# Patient Record
Sex: FEMALE | Race: WHITE | NOT HISPANIC OR LATINO | Employment: STUDENT | URBAN - METROPOLITAN AREA
[De-identification: names, ages, dates, MRNs, and addresses within clinical notes are randomized per-mention and may not be internally consistent; named-entity substitution may affect disease eponyms.]

---
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
## 2022-07-18 ENCOUNTER — APPOINTMENT (OUTPATIENT)
Dept: LAB | Facility: AMBULARY SURGERY CENTER | Age: 22
End: 2022-07-18
Attending: INTERNAL MEDICINE

## 2022-07-18 ENCOUNTER — OFFICE VISIT (OUTPATIENT)
Dept: GASTROENTEROLOGY | Facility: AMBULARY SURGERY CENTER | Age: 22
End: 2022-07-18

## 2022-07-18 VITALS
BODY MASS INDEX: 31.54 KG/M2 | HEIGHT: 62 IN | OXYGEN SATURATION: 100 % | SYSTOLIC BLOOD PRESSURE: 108 MMHG | RESPIRATION RATE: 18 BRPM | WEIGHT: 171.4 LBS | DIASTOLIC BLOOD PRESSURE: 62 MMHG | HEART RATE: 74 BPM

## 2022-07-18 DIAGNOSIS — K21.9 GASTROESOPHAGEAL REFLUX DISEASE WITHOUT ESOPHAGITIS: ICD-10-CM

## 2022-07-18 DIAGNOSIS — R10.13 EPIGASTRIC PAIN: ICD-10-CM

## 2022-07-18 DIAGNOSIS — R19.5 LOOSE STOOLS: ICD-10-CM

## 2022-07-18 DIAGNOSIS — R19.5 LOOSE STOOLS: Primary | ICD-10-CM

## 2022-07-18 PROBLEM — R10.9 ABDOMINAL PAIN: Status: ACTIVE | Noted: 2022-07-18

## 2022-07-18 LAB
ALBUMIN SERPL BCP-MCNC: 4 G/DL (ref 3.5–5)
ALP SERPL-CCNC: 82 U/L (ref 46–384)
ALT SERPL W P-5'-P-CCNC: 85 U/L (ref 12–78)
ANION GAP SERPL CALCULATED.3IONS-SCNC: 3 MMOL/L (ref 4–13)
AST SERPL W P-5'-P-CCNC: 74 U/L (ref 5–45)
BASOPHILS # BLD AUTO: 0.04 THOUSANDS/ΜL (ref 0–0.1)
BASOPHILS NFR BLD AUTO: 1 % (ref 0–1)
BILIRUB DIRECT SERPL-MCNC: 0.19 MG/DL (ref 0–0.2)
BILIRUB SERPL-MCNC: 0.89 MG/DL (ref 0.2–1)
BUN SERPL-MCNC: 12 MG/DL (ref 5–25)
CALCIUM SERPL-MCNC: 9.2 MG/DL (ref 8.3–10.1)
CHLORIDE SERPL-SCNC: 109 MMOL/L (ref 96–108)
CO2 SERPL-SCNC: 25 MMOL/L (ref 21–32)
CREAT SERPL-MCNC: 0.88 MG/DL (ref 0.6–1.3)
CRP SERPL QL: 7.4 MG/L
EOSINOPHIL # BLD AUTO: 0.23 THOUSAND/ΜL (ref 0–0.61)
EOSINOPHIL NFR BLD AUTO: 3 % (ref 0–6)
ERYTHROCYTE [DISTWIDTH] IN BLOOD BY AUTOMATED COUNT: 12.2 % (ref 11.6–15.1)
GFR SERPL CREATININE-BSD FRML MDRD: 95 ML/MIN/1.73SQ M
GLUCOSE SERPL-MCNC: 93 MG/DL (ref 65–140)
HCT VFR BLD AUTO: 43.9 % (ref 34.8–46.1)
HGB BLD-MCNC: 14.2 G/DL (ref 11.5–15.4)
IMM GRANULOCYTES # BLD AUTO: 0.01 THOUSAND/UL (ref 0–0.2)
IMM GRANULOCYTES NFR BLD AUTO: 0 % (ref 0–2)
LYMPHOCYTES # BLD AUTO: 2.03 THOUSANDS/ΜL (ref 0.6–4.47)
LYMPHOCYTES NFR BLD AUTO: 30 % (ref 14–44)
MCH RBC QN AUTO: 29 PG (ref 26.8–34.3)
MCHC RBC AUTO-ENTMCNC: 32.3 G/DL (ref 31.4–37.4)
MCV RBC AUTO: 90 FL (ref 82–98)
MONOCYTES # BLD AUTO: 0.42 THOUSAND/ΜL (ref 0.17–1.22)
MONOCYTES NFR BLD AUTO: 6 % (ref 4–12)
NEUTROPHILS # BLD AUTO: 3.94 THOUSANDS/ΜL (ref 1.85–7.62)
NEUTS SEG NFR BLD AUTO: 60 % (ref 43–75)
NRBC BLD AUTO-RTO: 0 /100 WBCS
PLATELET # BLD AUTO: 339 THOUSANDS/UL (ref 149–390)
PMV BLD AUTO: 10.8 FL (ref 8.9–12.7)
POTASSIUM SERPL-SCNC: 4.4 MMOL/L (ref 3.5–5.3)
PROT SERPL-MCNC: 7.7 G/DL (ref 6.4–8.4)
RBC # BLD AUTO: 4.9 MILLION/UL (ref 3.81–5.12)
SODIUM SERPL-SCNC: 137 MMOL/L (ref 135–147)
WBC # BLD AUTO: 6.67 THOUSAND/UL (ref 4.31–10.16)

## 2022-07-18 PROCEDURE — 86364 TISS TRNSGLTMNASE EA IG CLAS: CPT

## 2022-07-18 PROCEDURE — 80076 HEPATIC FUNCTION PANEL: CPT

## 2022-07-18 PROCEDURE — 99244 OFF/OP CNSLTJ NEW/EST MOD 40: CPT | Performed by: INTERNAL MEDICINE

## 2022-07-18 PROCEDURE — 82784 ASSAY IGA/IGD/IGG/IGM EACH: CPT

## 2022-07-18 PROCEDURE — 86258 DGP ANTIBODY EACH IG CLASS: CPT

## 2022-07-18 PROCEDURE — 86231 EMA EACH IG CLASS: CPT

## 2022-07-18 PROCEDURE — 36415 COLL VENOUS BLD VENIPUNCTURE: CPT

## 2022-07-18 PROCEDURE — 86140 C-REACTIVE PROTEIN: CPT

## 2022-07-18 PROCEDURE — 80048 BASIC METABOLIC PNL TOTAL CA: CPT

## 2022-07-18 PROCEDURE — 85025 COMPLETE CBC W/AUTO DIFF WBC: CPT

## 2022-07-18 RX ORDER — DICYCLOMINE HYDROCHLORIDE 10 MG/1
10 CAPSULE ORAL
Qty: 90 CAPSULE | Refills: 3 | Status: SHIPPED | OUTPATIENT
Start: 2022-07-18

## 2022-07-18 RX ORDER — NORETHINDRONE ACETATE AND ETHINYL ESTRADIOL 1MG-20(21)
KIT ORAL
COMMUNITY
Start: 2022-05-26

## 2022-07-18 NOTE — PATIENT INSTRUCTIONS
1 tablespoon of fiber supplement daily (benefiber, metamucil, citrucel)  Lactose free diet  Wait 3 hours after eating before lying down  Take famotidine 20mg (pepcid) as needed for heartburn

## 2022-07-18 NOTE — LETTER
July 18, 2022     Georges Reeder MD    Patient: Al Das   YOB: 2000   Date of Visit: 7/18/2022       Dear Dr Adilia Wilson:    Thank you for referring Al Das to me for evaluation  Below are my notes for this consultation  If you have questions, please do not hesitate to call me  I look forward to following your patient along with you  Sincerely,        Edie Granados MD        CC: No Recipients  Edie Granados MD  7/18/2022 11:52 AM  Sign when Signing Visit  Consultation - [facility]  Al Das 25 y o  female MRN: [id number]          Assessment & Plan:    Pleasant 57-year-old female with longstanding history of loose stools with postprandial urgency, abdominal pain which she describes as gas pain  1  Abdominal pain/loose stools:  Symptoms most consistent with irritable bowel syndrome with diarrhea predominant symptom, may have worsened recently in the setting increasing anxiety  Will evaluate for other pathology   -recommended stressors modification techniques, consideration to follow-up with a therapist for further evaluation for stressors  -discussed with her that her symptoms may be secondary to IBS versus underlying organic pathology  -we will check laboratory studies including celiac panel, CRP, stool studies to evaluate for other causes of diarrhea and abdominal pain   -recommended daily fiber supplementation   -recommended strict lactose-free diet  -trial of dicyclomine  -will re-evaluate in 2 to 3 months, if symptoms persist or change proceed with endoscopic evaluation    2  GERD:  Intermittent symptoms about 3 days per week   -discussed with her that she needs to avoid eating late at night, which she frequently eats dinner after she returns from a with late night shift  -recommended taking famotidine when she does eat dinner late at night    Tamara was seen today for abdominal pain, constipation and diarrhea      Diagnoses and all orders for this visit:    Loose stools  -     Basic metabolic panel; Future  -     CBC and differential; Future  -     Celiac Disease Antibody Profile; Future  -     C-reactive protein; Future  -     Hepatic function panel; Future  -     Calprotectin,Fecal; Future  -     Ova and parasite examination; Future  -     Fecal leukocytes; Future  -     Pancreatic elastase, fecal; Future  -     dicyclomine (BENTYL) 10 mg capsule; Take 1 capsule (10 mg total) by mouth 3 (three) times a day before meals    Epigastric pain  -     dicyclomine (BENTYL) 10 mg capsule; Take 1 capsule (10 mg total) by mouth 3 (three) times a day before meals    Gastroesophageal reflux disease without esophagitis            _____________________________________________________________        CC:  Abdominal pain and loose stools    HPI:  Casimiro Dong is a 25 y  o female who was referred for evaluation of abdominal pain and loose stools  This is a pleasant 30-year-old female, otherwise healthy has had a longstanding history of abdominal pain loose stools for approximately the past 5 or 6 years  She reports having frequent bowel movements about 4 times per day with loose watery stools, occasional having urgency  She reports which she describes as gas pains throughout the course of the day  Rarely has constipation  Denies any melena or rectal bleeding  Patient has tried dietary modifications including gluten free diet, has tried to avoid dairy products without any significant improvement in her symptoms  She notes that over the last 1 or 2 years her symptoms have worsened  She does report having increased anxiety over the last 1 year which seem to precipitate some of the symptoms as well  Much of the pain occurs postprandially  She has not been able to identify any specific foods that precipitate her symptoms  She has gained about 30 lb in last 1 year        She has heartburn symptoms about 3 times per week for which she takes over-the-counter antacids including Gas-X and Tums with improvement in symptoms  He takes NSAIDs notably ibuprofen about 3 days during her  She has never had evaluation for the above symptoms  Patient is otherwise healthy    Surgical history is notable for tonsillectomy     Denies any tobacco, rarely drinks  She is currently of rising sophomore at [employer] also works at a [employer] or [employer]  Family history is negative for GI or associated malignancies though it is unknown as her mother is adopted  ROS:  The remainder of the ROS was negative except for the pertinent positives mentioned in HPI  Allergies: Patient has no known allergies  Medications:   Current Outpatient Medications:     dicyclomine (BENTYL) 10 mg capsule, Take 1 capsule (10 mg total) by mouth 3 (three) times a day before meals, Disp: 90 capsule, Rfl: 3    Blisovi FE 1/20 1-20 MG-MCG per tablet, , Disp: , Rfl:     Lo Loestrin Fe 1 MG-10 MCG / 10 MCG TABS, TAKE 1 TABLET DAILY (Patient not taking: Reported on 7/18/2022), Disp: 84 tablet, Rfl: 0'    Past Medical History:   Diagnosis Date    Acute pyelonephritis 7/3/2019    Head injury     Tendonitis     R ankle    Tonsillitis     Urinary tract infection        Past Surgical History:   Procedure Laterality Date    TONSILLECTOMY      TONSILLECTOMY      WISDOM TOOTH EXTRACTION         Family History   Problem Relation Age of Onset    No Known Problems Mother         adopted, frequent UTI's as a teen   No Known Problems Father     Cancer Paternal Grandmother     Cancer Paternal Grandfather         reports that she has never smoked  She has never used smokeless tobacco  She reports that she does not drink alcohol and does not use drugs            Physical Exam:     /62 (BP Location: Right arm, Patient Position: Sitting, Cuff Size: Standard)   Pulse 74   Resp 18   Ht 5' 2" (1 575 m)   Wt 77 7 kg (171 lb 6 4 oz)   SpO2 100%   BMI 31 35 kg/m²     Gen: wn/wd, NAD, healthy-appearing female  HEENT: anicteric, MMM, no cervical LAD  CVS: RRR, no m/r/g  CHEST: CTA b/l  ABD: +BS, soft, NT,ND, no hepatosplenomegaly  EXT: no c/c/e  NEURO: aaox3  SKIN: NO rashes

## 2022-07-18 NOTE — PROGRESS NOTES
Consultation - [facility]  Yady Tucker 25 y o  female MRN: [id number]          Assessment & Plan:    Pleasant 53-year-old female with longstanding history of loose stools with postprandial urgency, abdominal pain which she describes as gas pain  1  Abdominal pain/loose stools:  Symptoms most consistent with irritable bowel syndrome with diarrhea predominant symptom, may have worsened recently in the setting increasing anxiety  Will evaluate for other pathology   -recommended stressors modification techniques, consideration to follow-up with a therapist for further evaluation for stressors  -discussed with her that her symptoms may be secondary to IBS versus underlying organic pathology  -we will check laboratory studies including celiac panel, CRP, stool studies to evaluate for other causes of diarrhea and abdominal pain   -recommended daily fiber supplementation   -recommended strict lactose-free diet  -trial of dicyclomine  -will re-evaluate in 2 to 3 months, if symptoms persist or change proceed with endoscopic evaluation    2  GERD:  Intermittent symptoms about 3 days per week   -discussed with her that she needs to avoid eating late at night, which she frequently eats dinner after she returns from a with late night shift  -recommended taking famotidine when she does eat dinner late at night    Tamara was seen today for abdominal pain, constipation and diarrhea  Diagnoses and all orders for this visit:    Loose stools  -     Basic metabolic panel; Future  -     CBC and differential; Future  -     Celiac Disease Antibody Profile; Future  -     C-reactive protein; Future  -     Hepatic function panel; Future  -     Calprotectin,Fecal; Future  -     Ova and parasite examination; Future  -     Fecal leukocytes; Future  -     Pancreatic elastase, fecal; Future  -     dicyclomine (BENTYL) 10 mg capsule;  Take 1 capsule (10 mg total) by mouth 3 (three) times a day before meals    Epigastric pain  -     dicyclomine (BENTYL) 10 mg capsule; Take 1 capsule (10 mg total) by mouth 3 (three) times a day before meals    Gastroesophageal reflux disease without esophagitis            _____________________________________________________________        CC:  Abdominal pain and loose stools    HPI:  Lobo Quiroz is a 25 y  o female who was referred for evaluation of abdominal pain and loose stools  This is a pleasant 79-year-old female, otherwise healthy has had a longstanding history of abdominal pain loose stools for approximately the past 5 or 6 years  She reports having frequent bowel movements about 4 times per day with loose watery stools, occasional having urgency  She reports which she describes as gas pains throughout the course of the day  Rarely has constipation  Denies any melena or rectal bleeding  Patient has tried dietary modifications including gluten free diet, has tried to avoid dairy products without any significant improvement in her symptoms  She notes that over the last 1 or 2 years her symptoms have worsened  She does report having increased anxiety over the last 1 year which seem to precipitate some of the symptoms as well  Much of the pain occurs postprandially  She has not been able to identify any specific foods that precipitate her symptoms  She has gained about 30 lb in last 1 year  She has heartburn symptoms about 3 times per week for which she takes over-the-counter antacids including Gas-X and Tums with improvement in symptoms  He takes NSAIDs notably ibuprofen about 3 days during her  She has never had evaluation for the above symptoms  Patient is otherwise healthy    Surgical history is notable for tonsillectomy     Denies any tobacco, rarely drinks  She is currently of rising sophomore at [employer] also works at a [employer] or [employer]        Family history is negative for GI or associated malignancies though it is unknown as her mother is adopted  ROS:  The remainder of the ROS was negative except for the pertinent positives mentioned in HPI  Allergies: Patient has no known allergies  Medications:   Current Outpatient Medications:     dicyclomine (BENTYL) 10 mg capsule, Take 1 capsule (10 mg total) by mouth 3 (three) times a day before meals, Disp: 90 capsule, Rfl: 3    Blisovi FE 1/20 1-20 MG-MCG per tablet, , Disp: , Rfl:     Lo Loestrin Fe 1 MG-10 MCG / 10 MCG TABS, TAKE 1 TABLET DAILY (Patient not taking: Reported on 7/18/2022), Disp: 84 tablet, Rfl: 0'    Past Medical History:   Diagnosis Date    Acute pyelonephritis 7/3/2019    Head injury     Tendonitis     R ankle    Tonsillitis     Urinary tract infection        Past Surgical History:   Procedure Laterality Date    TONSILLECTOMY      TONSILLECTOMY      WISDOM TOOTH EXTRACTION         Family History   Problem Relation Age of Onset    No Known Problems Mother         adopted, frequent UTI's as a teen   No Known Problems Father     Cancer Paternal Grandmother     Cancer Paternal Grandfather         reports that she has never smoked  She has never used smokeless tobacco  She reports that she does not drink alcohol and does not use drugs            Physical Exam:     /62 (BP Location: Right arm, Patient Position: Sitting, Cuff Size: Standard)   Pulse 74   Resp 18   Ht 5' 2" (1 575 m)   Wt 77 7 kg (171 lb 6 4 oz)   SpO2 100%   BMI 31 35 kg/m²     Gen: wn/wd, NAD, healthy-appearing female  HEENT: anicteric, MMM, no cervical LAD  CVS: RRR, no m/r/g  CHEST: CTA b/l  ABD: +BS, soft, NT,ND, no hepatosplenomegaly  EXT: no c/c/e  NEURO: aaox3  SKIN: NO rashes

## 2022-07-19 ENCOUNTER — APPOINTMENT (OUTPATIENT)
Dept: LAB | Facility: AMBULARY SURGERY CENTER | Age: 22
End: 2022-07-19
Attending: INTERNAL MEDICINE

## 2022-07-19 DIAGNOSIS — R19.5 LOOSE STOOLS: ICD-10-CM

## 2022-07-19 LAB
ENDOMYSIUM IGA SER QL: NEGATIVE
GLIADIN PEPTIDE IGA SER-ACNC: 3 UNITS (ref 0–19)
GLIADIN PEPTIDE IGG SER-ACNC: 1 UNITS (ref 0–19)
IGA SERPL-MCNC: 77 MG/DL (ref 87–352)
TTG IGA SER-ACNC: <2 U/ML (ref 0–3)
TTG IGG SER-ACNC: <2 U/ML (ref 0–5)

## 2022-07-19 PROCEDURE — 87209 SMEAR COMPLEX STAIN: CPT

## 2022-07-19 PROCEDURE — 83993 ASSAY FOR CALPROTECTIN FECAL: CPT

## 2022-07-19 PROCEDURE — 89055 LEUKOCYTE ASSESSMENT FECAL: CPT

## 2022-07-19 PROCEDURE — 82653 EL-1 FECAL QUANTITATIVE: CPT

## 2022-07-19 PROCEDURE — 87177 OVA AND PARASITES SMEARS: CPT

## 2022-07-20 LAB — WBC SPEC QL GRAM STN: NORMAL

## 2022-07-22 LAB — CALPROTECTIN STL-MCNT: <16 UG/G (ref 0–120)

## 2022-07-24 LAB — ELASTASE PANC STL-MCNT: 243 UG ELAST./G

## 2022-08-02 ENCOUNTER — PATIENT MESSAGE (OUTPATIENT)
Dept: GASTROENTEROLOGY | Facility: AMBULARY SURGERY CENTER | Age: 22
End: 2022-08-02

## 2022-09-27 ENCOUNTER — TELEPHONE (OUTPATIENT)
Dept: OBGYN CLINIC | Facility: CLINIC | Age: 22
End: 2022-09-27

## 2022-09-27 NOTE — TELEPHONE ENCOUNTER
Pt called to make her yearly appt  She also mentioned she was having a yeast infection   I instructed her to try monistat 3 or 7 day and if that doesn't work to give us a call back

## 2022-10-03 ENCOUNTER — TELEPHONE (OUTPATIENT)
Dept: OTHER | Facility: OTHER | Age: 22
End: 2022-10-03

## 2022-10-03 NOTE — TELEPHONE ENCOUNTER
Patient called back  Confirmed appt on 10/11/2022 at 11:00 am with Landon Beatty PA-C  Patient verified medical insurance, [facility]

## 2023-01-09 ENCOUNTER — ANNUAL EXAM (OUTPATIENT)
Dept: OBGYN CLINIC | Facility: CLINIC | Age: 23
End: 2023-01-09

## 2023-01-09 VITALS
SYSTOLIC BLOOD PRESSURE: 122 MMHG | WEIGHT: 180.4 LBS | DIASTOLIC BLOOD PRESSURE: 78 MMHG | BODY MASS INDEX: 33.2 KG/M2 | HEIGHT: 62 IN

## 2023-01-09 DIAGNOSIS — N94.6 DYSMENORRHEA: ICD-10-CM

## 2023-01-09 DIAGNOSIS — Z01.419 ENCOUNTER FOR GYNECOLOGICAL EXAMINATION (GENERAL) (ROUTINE) WITHOUT ABNORMAL FINDINGS: Primary | ICD-10-CM

## 2023-01-09 DIAGNOSIS — N76.0 VULVOVAGINITIS: ICD-10-CM

## 2023-01-09 DIAGNOSIS — Z30.09 BIRTH CONTROL COUNSELING: ICD-10-CM

## 2023-01-09 DIAGNOSIS — Z11.3 SCREENING FOR STD (SEXUALLY TRANSMITTED DISEASE): ICD-10-CM

## 2023-01-09 NOTE — PROGRESS NOTES
Assessment/Plan   Diagnoses and all orders for this visit:    Encounter for gynecological examination (general) (routine) without abnormal findings  Pap smears will start at age 24 per the ASCCP guidelines  Screening for STD (sexually transmitted disease)  -     Chlamydia/GC amplified DNA by PCR; Future  -     Hepatitis B surface antigen; Future  -     Hepatitis C antibody; Future  -     HIV 1/2 AG/AB w Reflex [facility] for 2 yr old and above; Future  -     RPR; Future  Vulvovaginitis  Encourage safe sexual practices; STI testing - C/G ordered to have done off first urine of day at lab; STI labs ordered as well; Reviewed since declines pelvic exam today, cannot assess for infection; if symptoms persist/worsen after period can trial longer course of boric acid or OTC Monistat 3 or 7  She can also RTO if symptoms persist, worsen, fail OTC therapies    Dysmenorrhea  Encourage patient to trial ibuprofen 600-800 mg taken with food q 6-8 hours as needed for menstrual cramps - advise to start prior to onset of cramps to get better control  Birth control counseling  Various contraceptive options were reviewed including, but not limited to, barrier methods (condoms, diaphragm), both combination (pill, patch, vaginal ring) and progesterone- only (pill, Depo provera, and Nexplanon), intrauterine devices (arlene, Mirena and Paragard)  The mechanisms, risks, benefits, and side effects of all methods were discussed  All questions have been answered to her satisfaction  She is most interested in C/ [place] [address]  Discussed Nexplanon as a birth control option in detail  Placed for 3 years  Reviewed the most common side effects of dysfunctional uterine bleeding for the first few months after insertion, headaches, breast tenderness, and mood fluctuations  Most women's cycles will regulate to one period each month  Some women will experience amenorrhea and some women will experience a heavier, more crampy period    Reviewed insertion and removal process in the office  Small risk of infection after the procedure, can't lift same arm for 24 hours, fertility should return after 1 month of removal  Small risk of migrating or cannot located Nexplanon upon removal which would require further surgical procedure  Patient aware will be contacted by office in regards to coverage, ordering, and scheduling  Discussion  Patient currently on her period and states it is heavy - she declines a pelvic examination today - reviewed next year important to have a pelvic exam done and will initiate pap smear screening so call to reschedule if on period  She expressed understanding  I have discussed the importance of monthly self-breast exams, exercise and healthy diet as well as adequate intake of calcium and vitamin D  Encourage MVI q day and r/toña importance of folic acid; Encourage 30-40 min weight bearing exercise most days of week  Contraception - continue condom use for now  The patient has had the Gardasil vaccine series, which is recommended for patients from 526 years of age  All questions have been answered to her satisfaction  RTO for APE or sooner if needed    Subjective     HPI   Matilda aGllardo is a 23 y o  female who presents for annual well woman exam    Menarche - 11; LMP - 1/7/23; Periods are reg q month and last 5 days; No excessive bleeding; No intermenstrual bleeding or spotting; Cramps are intense - with most recent period - got nauseous, tylenol didn't help and heating pad didn't help which usually does  Does report a thicker white discharge that causing some vulvar-vaginal irritation for the past month - tried a couple days of boric acid - helped while on but symptoms returned after stopping;  No urinary sx - burning/pain/frequency/hematuria  (+) SBEs - no breast masses, asymmetry, nipple discharge or bleeding, changes in skin of breast, or breast tenderness bilaterally  No abd/pelvic pain or HAs;   Pt is sexually active in a mutually monog sexual relationship x 2 years; No issues with intercourse; She is ok to have sti/hiv/hep testing; Feels safe at home  Current contraception: condoms; stopped OCP about a year ago because felt on edge all the time - she was on it for about 2-3 years (first tried Loestrin Fe  and then switched to Lo Loestrin Fe); felt like she gained weight after she stopped OCP  Gardasil - done through PCP  (+) PCP for routine Bw/care; Last Pap - none  History of abnormal Pap smear:   Last STI screen - none    Review of Systems   Constitutional: Negative for activity change, fatigue, fever and unexpected weight change (noticed weight gain after getting off BC)  HENT: Negative for congestion, dental problem, sinus pressure and sinus pain  Eyes: Negative for visual disturbance  Respiratory: Negative for cough, shortness of breath and wheezing  Cardiovascular: Negative for chest pain and leg swelling  Gastrointestinal: Negative for abdominal distention, abdominal pain, blood in stool, constipation, diarrhea, nausea and vomiting  Endocrine: Negative for polydipsia  Genitourinary: Negative for difficulty urinating, dyspareunia, dysuria, frequency, hematuria, menstrual problem, pelvic pain, urgency, vaginal bleeding, vaginal discharge and vaginal pain  Musculoskeletal: Negative for arthralgias and back pain  Allergic/Immunologic: Negative for environmental allergies  Neurological: Negative for dizziness, seizures and headaches  Psychiatric/Behavioral: Negative for dysphoric mood and sleep disturbance  The patient is not nervous/anxious          The following portions of the patient's history were reviewed and updated as appropriate: allergies, current medications, past family history, past medical history, past social history, past surgical history and problem list          OB History        0    Para   0    Term   0       0    AB   0    Living   0       SAB   0    IAB   0    Ectopic   0 Multiple   0    Live Births   0                 Past Medical History:   Diagnosis Date   • Acute pyelonephritis 7/3/2019   • Head injury    • Tendonitis     R ankle   • Tonsillitis    • Urinary tract infection        Past Surgical History:   Procedure Laterality Date   • TONSILLECTOMY     • TONSILLECTOMY     • WISDOM TOOTH EXTRACTION         Family History   Problem Relation Age of Onset   • No Known Problems Mother         adopted, frequent UTI's as a teen     • No Known Problems Father    • Cancer Paternal Grandmother    • Cancer Paternal Grandfather        Social History     Socioeconomic History   • Marital status: Single     Spouse name: Not on file   • Number of children: Not on file   • Years of education: Not on file   • Highest education level: Not on file   Occupational History   • Not on file   Tobacco Use   • Smoking status: Never   • Smokeless tobacco: Never   Vaping Use   • Vaping Use: Never used   Substance and Sexual Activity   • Alcohol use: Never   • Drug use: Never   • Sexual activity: Yes     Partners: Male     Birth control/protection: Condom Male     Comment: Declines std testing    Other Topics Concern   • Not on file   Social History Narrative   • Not on file     Social Determinants of Health     Financial Resource Strain: Not on file   Food Insecurity: Not on file   Transportation Needs: Not on file   Physical Activity: Not on file   Stress: Not on file   Social Connections: Not on file   Intimate Partner Violence: Not on file   Housing Stability: Not on file         Current Outpatient Medications:   •  dicyclomine (BENTYL) 10 mg capsule, Take 1 capsule (10 mg total) by mouth 3 (three) times a day before meals, Disp: 90 capsule, Rfl: 3  •  Blisovi FE 1/20 1-20 MG-MCG per tablet, , Disp: , Rfl:   •  Lo Loestrin Fe 1 MG-10 MCG / 10 MCG TABS, TAKE 1 TABLET DAILY (Patient not taking: Reported on 7/18/2022), Disp: 84 tablet, Rfl: 0    No Known Allergies    Objective   Vitals:    01/09/23 0957 BP: 122/78   BP Location: Left arm   Patient Position: Sitting   Cuff Size: Standard   Weight: 81 8 kg (180 lb 6 4 oz)   Height: 5' 2" (1 575 m)     Physical Exam  Vitals reviewed  Constitutional:       General: She is awake  She is not in acute distress  Appearance: Normal appearance  She is well-developed and well-groomed  She is not ill-appearing, toxic-appearing or diaphoretic  HENT:      Head: Normocephalic and atraumatic  Eyes:      Conjunctiva/sclera: Conjunctivae normal    Neck:      Thyroid: No thyroid mass, thyromegaly or thyroid tenderness  Cardiovascular:      Rate and Rhythm: Normal rate and regular rhythm  Heart sounds: Normal heart sounds  No murmur heard  Pulmonary:      Effort: Pulmonary effort is normal  No tachypnea, bradypnea or respiratory distress  Breath sounds: Normal breath sounds  No stridor or decreased air movement  No wheezing  Chest:   Breasts:     Breasts are symmetrical       Right: Normal  No swelling, bleeding, inverted nipple, mass, nipple discharge, skin change or tenderness  Left: Normal  No swelling, bleeding, inverted nipple, mass, nipple discharge, skin change or tenderness  Abdominal:      General: There is no distension  Palpations: Abdomen is soft  There is no hepatomegaly, splenomegaly or mass  Tenderness: There is no abdominal tenderness  Hernia: No hernia is present  Lymphadenopathy:      Cervical: No cervical adenopathy  Upper Body:      Right upper body: No supraclavicular or axillary adenopathy  Left upper body: No supraclavicular or axillary adenopathy  Skin:     General: Skin is warm and dry  Neurological:      Mental Status: She is alert and oriented to person, place, and time  Psychiatric:         Mood and Affect: Mood and affect normal          Speech: Speech normal          Behavior: Behavior normal  Behavior is cooperative  Thought Content:  Thought content normal          Judgment: Judgment normal

## 2023-02-08 ENCOUNTER — TELEPHONE (OUTPATIENT)
Dept: OBGYN CLINIC | Facility: CLINIC | Age: 23
End: 2023-02-08

## 2023-02-08 NOTE — TELEPHONE ENCOUNTER
Spoke with patient and let her know she is covered for Nexplanon  She would like more time to consider insert  Instructed patient to call back when/if she is interested   [id number] [facility]

## 2023-03-15 ENCOUNTER — AMB VIDEO VISIT (OUTPATIENT)
Dept: OTHER | Facility: HOSPITAL | Age: 23
End: 2023-03-15

## 2023-03-15 DIAGNOSIS — H10.31 ACUTE BACTERIAL CONJUNCTIVITIS OF RIGHT EYE: Primary | ICD-10-CM

## 2023-03-15 RX ORDER — OFLOXACIN 3 MG/ML
1 SOLUTION/ DROPS OPHTHALMIC 4 TIMES DAILY
Qty: 5 ML | Refills: 0 | Status: SHIPPED | OUTPATIENT
Start: 2023-03-15 | End: 2023-03-22

## 2023-03-15 NOTE — PROGRESS NOTES
Video Visit - Sophia Lazaro 23 y o  female MRN: [id number]    REQUIRED DOCUMENTATION:         1  This service was provided via [email or web address]  2  Provider located at [address] [place] [id number] [phone]  3  [facility] provider: Ani Kaur PA-C   4  Identify all parties in room with patient during [facility] visit:  No one else  5  After connecting through [employer], patient was identified by name and date of birth  Patient was then informed that this was a Telemedicine visit and that the exam was being conducted confidentially over secure lines  My office door was closed  No one else was in the room  Patient acknowledged consent and understanding of privacy and security of the Telemedicine visit  I informed the patient that I have reviewed their record in Epic and presented the opportunity for them to ask any questions regarding the visit today  The patient agreed to participate  HPI  Patient states that she woke up with her eye watering and discharge  It was crusted shut  She does not wear contact lens  She denies any light sensitivity today, blurry vision, pain  She also has a headache  She has a bit of a stuffy nose  Review of Systems   Constitutional: Negative  HENT: Negative  Eyes: Positive for discharge, redness and itching  Negative for photophobia, pain and visual disturbance  Respiratory: Negative  Cardiovascular: Negative  Gastrointestinal: Negative  Musculoskeletal: Negative  Neurological: Negative  Psychiatric/Behavioral: Negative  Physical Exam  Constitutional:       General: She is not in acute distress  Appearance: Normal appearance  She is not ill-appearing, toxic-appearing or diaphoretic  HENT:      Head: Normocephalic and atraumatic  Eyes:      General:         Right eye: Discharge present  Extraocular Movements: Extraocular movements intact        Conjunctiva/sclera: Conjunctivae normal  Pulmonary:      Effort: Pulmonary effort is normal    Skin:     General: Skin is dry  Neurological:      General: No focal deficit present  Mental Status: She is alert and oriented to person, place, and time  Psychiatric:         Mood and Affect: Mood normal          Behavior: Behavior normal          Diagnoses and all orders for this visit:    Acute bacterial conjunctivitis of right eye  -     ofloxacin (OCUFLOX) 0 3 % ophthalmic solution; Administer 1 drop to the right eye 4 (four) times a day for 7 days      Patient Instructions     Conjunctivitis   WHAT YOU NEED TO KNOW:   Conjunctivitis, or pink eye, is inflammation of your conjunctiva  The conjunctiva is a thin tissue that covers the front of your eye and the back of your eyelids  The conjunctiva helps protect your eye and keep it moist  Conjunctivitis may be caused by bacteria, allergies, or a virus  If your conjunctivitis is caused by bacteria, it may get better on its own in about 7 days  Viral conjunctivitis can last up to 3 weeks  DISCHARGE INSTRUCTIONS:   Return to the emergency department if:   • You have worsening eye pain  • The swelling in your eye gets worse, even after treatment  • Your vision suddenly becomes worse or you cannot see at all  Call your doctor if:   • You develop a fever and ear pain  • You have tiny bumps or spots of blood on your eye  • You have questions or concerns about your condition or care  Manage your symptoms:   • Apply a cool compress  Wet a washcloth with cold water and place it on your eye  This will help decrease itching and irritation  • Do not wear contact lenses  They can irritate your eye  Throw away the pair you are using and ask when you can wear them again  Use a new pair of lenses when your provider says it is okay  • Avoid irritants  Stay away from smoke filled areas  Shield your eyes from wind and sun  • Flush your eye    You may need to flush your eye with saline to help decrease your symptoms  Ask for more information on how to flush your eye  Medicines:  Treatment depends on what is causing your conjunctivitis  You may be given any of the following:  • Allergy medicine  helps decrease itchy, red, swollen eyes caused by allergies  It may be given as a pill, eye drops, or nasal spray  • Antibiotics  may be needed if your conjunctivitis is caused by bacteria  This medicine may be given as a pill, eye drops, or eye ointment  • Take your medicine as directed  Contact your healthcare provider if you think your medicine is not helping or if you have side effects  Tell your provider if you are allergic to any medicine  Keep a list of the medicines, vitamins, and herbs you take  Include the amounts, and when and why you take them  Bring the list or the pill bottles to follow-up visits  Carry your medicine list with you in case of an emergency  Prevent the spread of conjunctivitis:   • Wash your hands with soap and water often  Wash your hands before and after you touch your eyes  Also wash your hands before you prepare or eat food and after you use the bathroom or change a diaper  • Avoid allergens  Try to avoid the things that cause your allergies, such as pets, dust, or grass  • Avoid contact with others  Do not share towels or washcloths  Try to stay away from others as much as possible  Ask when you can return to work or school  • Throw away eye makeup  The bacteria that caused your conjunctivitis can stay in eye makeup  Throw away your current mascara and other eye makeup  Never share mascara or other eye makeup with anyone  Follow up with your doctor as directed:  Write down your questions so you remember to ask them during your visits  © Copyright Anne Marie Orr 2022 Information is for End User's use only and may not be sold, redistributed or otherwise used for commercial purposes  The above information is an  only   It is not intended as medical advice for individual conditions or treatments  Talk to your doctor, nurse or pharmacist before following any medical regimen to see if it is safe and effective for you

## 2023-03-15 NOTE — PATIENT INSTRUCTIONS
Conjunctivitis   WHAT YOU NEED TO KNOW:   Conjunctivitis, or pink eye, is inflammation of your conjunctiva  The conjunctiva is a thin tissue that covers the front of your eye and the back of your eyelids  The conjunctiva helps protect your eye and keep it moist  Conjunctivitis may be caused by bacteria, allergies, or a virus  If your conjunctivitis is caused by bacteria, it may get better on its own in about 7 days  Viral conjunctivitis can last up to 3 weeks  DISCHARGE INSTRUCTIONS:   Return to the emergency department if:   You have worsening eye pain  The swelling in your eye gets worse, even after treatment  Your vision suddenly becomes worse or you cannot see at all  Call your doctor if:   You develop a fever and ear pain  You have tiny bumps or spots of blood on your eye  You have questions or concerns about your condition or care  Manage your symptoms:   Apply a cool compress  Wet a washcloth with cold water and place it on your eye  This will help decrease itching and irritation  Do not wear contact lenses  They can irritate your eye  Throw away the pair you are using and ask when you can wear them again  Use a new pair of lenses when your provider says it is okay  Avoid irritants  Stay away from smoke filled areas  Shield your eyes from wind and sun  Flush your eye  You may need to flush your eye with saline to help decrease your symptoms  Ask for more information on how to flush your eye  Medicines:  Treatment depends on what is causing your conjunctivitis  You may be given any of the following: Allergy medicine  helps decrease itchy, red, swollen eyes caused by allergies  It may be given as a pill, eye drops, or nasal spray  Antibiotics  may be needed if your conjunctivitis is caused by bacteria  This medicine may be given as a pill, eye drops, or eye ointment  Take your medicine as directed    Contact your healthcare provider if you think your medicine is not helping or if you have side effects  Tell your provider if you are allergic to any medicine  Keep a list of the medicines, vitamins, and herbs you take  Include the amounts, and when and why you take them  Bring the list or the pill bottles to follow-up visits  Carry your medicine list with you in case of an emergency  Prevent the spread of conjunctivitis:   Wash your hands with soap and water often  Wash your hands before and after you touch your eyes  Also wash your hands before you prepare or eat food and after you use the bathroom or change a diaper  Avoid allergens  Try to avoid the things that cause your allergies, such as pets, dust, or grass  Avoid contact with others  Do not share towels or washcloths  Try to stay away from others as much as possible  Ask when you can return to work or school  Throw away eye makeup  The bacteria that caused your conjunctivitis can stay in eye makeup  Throw away your current mascara and other eye makeup  Never share mascara or other eye makeup with anyone  Follow up with your doctor as directed:  Write down your questions so you remember to ask them during your visits  © Copyright Rufina Harder 2022 Information is for End User's use only and may not be sold, redistributed or otherwise used for commercial purposes  The above information is an  only  It is not intended as medical advice for individual conditions or treatments  Talk to your doctor, nurse or pharmacist before following any medical regimen to see if it is safe and effective for you

## 2023-03-15 NOTE — CARE ANYWHERE EVISITS
Visit Summary for [facility] - Gender: Female - Date of Birth: [birth year]  Date: [id number] - Duration: 2 minutes  Patient: [facility]  Provider: Stacey Belcher PA-C    Patient Contact Information  Address  [address]; [address]   [phone]    Visit Topics    Triage Questions   What is your current physical address in the event of a medical emergency? Answer []  Are you allergic to any medications? Answer []  Are you now or could you be pregnant? Answer []  Do you have any immune system compromise or chronic lung   disease? Answer []  Do you have any vulnerable family members in the home (infant, pregnant, cancer, elderly)? Answer []     Conversation Transcripts  [0A][0A] [Notification] You are connected with Stacey Belcher PA-C, Urgent Care Specialist [0A][Notification] Favio Gimenez is located in [address] [place] [address]  [0A][Notification] Favio Gimenez has shared health history  Gwenette Carrier  [0A]    Diagnosis  Unspecified acute conjunctivitis, right eye    Procedures  Value: [id number] Code: CPT-4 UNLISTED E&M SERVICE    Medications Prescribed    No prescriptions ordered    Electronically signed by: Sada Glez(NPI [id number])

## 2023-06-26 NOTE — ED NOTES
Gave report to Sol Tay at [facility]        Mariya Lua, ANAM  07/03/19 0546 No

## 2023-10-31 ENCOUNTER — AMB VIDEO VISIT (OUTPATIENT)
Dept: OTHER | Facility: HOSPITAL | Age: 23
End: 2023-10-31

## 2023-10-31 DIAGNOSIS — K52.9 GASTROENTERITIS: Primary | ICD-10-CM

## 2023-10-31 PROBLEM — N10 ACUTE PYELONEPHRITIS: Status: RESOLVED | Noted: 2019-07-03 | Resolved: 2023-10-31

## 2023-10-31 PROBLEM — Z01.419 WELL WOMAN EXAM: Status: RESOLVED | Noted: 2019-06-05 | Resolved: 2023-10-31

## 2023-10-31 PROBLEM — S02.85XA ORBITAL FRACTURE, CLOSED, INITIAL ENCOUNTER (HCC): Status: RESOLVED | Noted: 2019-03-18 | Resolved: 2023-10-31

## 2023-10-31 PROBLEM — S06.0X0A CONCUSSION WITH NO LOSS OF CONSCIOUSNESS: Status: RESOLVED | Noted: 2019-03-18 | Resolved: 2023-10-31

## 2023-10-31 PROCEDURE — ECARE PR SL URGENT CARE VIRTUAL VISIT: Performed by: PHYSICIAN ASSISTANT

## 2023-10-31 RX ORDER — ONDANSETRON 4 MG/1
4 TABLET, FILM COATED ORAL EVERY 8 HOURS PRN
Qty: 20 TABLET | Refills: 0 | Status: SHIPPED | OUTPATIENT
Start: 2023-10-31

## 2023-10-31 NOTE — LETTER
October 31, 2023     Patient: Dee Meza   YOB: 2000   Date of Visit: 10/31/2023       To Whom it May Concern:    Dee Meza is under my professional care. She was seen virtually on 10/31/2023 for illness that began 10/30/2023. She may return to school on 11/01/2023 . If you have any questions or concerns, please don't hesitate to call.          Sincerely,          All Brar PA-C        CC: No Recipients

## 2023-10-31 NOTE — PROGRESS NOTES
Video Visit - Janet Buitrago 26 y.o. female MRN: [id number]    REQUIRED DOCUMENTATION:         1. This service was provided via [employer]. 2. Provider located at [address] [place] [id number] [phone].  3. [facility] provider: Alejandro Pollard PA-C.  4. Identify all parties in room with patient during [facility] visit:  No one else  5. After connecting through [employer], patient was identified by name and date of birth. Patient was then informed that this was a Telemedicine visit and that the exam was being conducted confidentially over secure lines. My office door was closed. No one else was in the room. Patient acknowledged consent and understanding of privacy and security of the Telemedicine visit. I informed the patient that I have reviewed their record in Epic and presented the opportunity for them to ask any questions regarding the visit today. The patient agreed to participate. VITALS: Heart Rate: 68 BPM, Respiratory Rate: 12 RPM, Temperature Unavailable° F, Blood Pressure Unavailable mmHg, Pulse Ox Unavailable % on RA    HPI  Pt reports Sunday started with diarrhea which is watery and vomited x 1 nbnb. Denies blood or mucous in stool. Nothing tried. Over feels sx worsening. When she does have abd pain it is LLQ and crampy. Reports it is similar to IBS sx she has experience, but doesn't typically have vomiting as well. Reports 8 episodes of diarrhea in last 24 hours. Denies recent travel, recent abx, suspicious food intake, known sick contacts. No current abdominal pain, no fever, dysuria, back pain, myalgias. Reports decreased oral intake. Last urinated 30 min ago. Last BM 0600. Declines stool studies  Physical Exam  Constitutional:       General: She is not in acute distress. Appearance: Normal appearance. She is well-groomed and normal weight. She is not ill-appearing or toxic-appearing. HENT:      Head: Normocephalic and atraumatic. Nose: No rhinorrhea. Mouth/Throat:      Mouth: Mucous membranes are moist.   Eyes:      Conjunctiva/sclera: Conjunctivae normal.   Cardiovascular:      Rate and Rhythm: Normal rate. Pulmonary:      Effort: Pulmonary effort is normal. No respiratory distress. Breath sounds: No wheezing (no gross audible wheeze through computer). Musculoskeletal:      Cervical back: Normal range of motion. Skin:     Findings: No rash (on face or neck). Neurological:      Mental Status: She is alert. Cranial Nerves: No dysarthria or facial asymmetry. Psychiatric:         Mood and Affect: Mood normal.         Behavior: Behavior normal.         Diagnoses and all orders for this visit:    Gastroenteritis  -     ondansetron (ZOFRAN) 4 mg tablet; Take 1 tablet (4 mg total) by mouth every 8 (eight) hours as needed for nausea or vomiting      Patient Instructions   Avoid dairy x 1-2 weeks  Clear liquid to bland diet as tolerated. No greasy, spicy or acidic foods/drinks. Schedule a follow-up appointment with your primary care physician for recheck in 2-3 days. If you cannot see your PCP, you can schedule a follow up appointment at a [facility]. Go to the emergency department if you develop any new or worsening symptoms including fever, worsening pain, or anything else that is concerning. Excuses can be found in "Letters" section of [employer] joann. Can print if opened from a [facility] phone number is [phone] if you need assistance or have further questions    1 21  ([phone])  Schedule or Reschedule Outpatient Testing - Option 2  Billing - Option 3  General Info - Option 4  [facility] Help - Option 5  Comprehensive Spine Program - Option 6   COVID - Option 7    Nutrition Tips for Relief of Diarrhea   WHAT YOU NEED TO KNOW:   There are diet changes you can make to help relieve or stop diarrhea.  These changes include limiting or avoiding foods and liquids that are high in sugar, fat, fiber, and lactose. Lactose is a sugar found in milk products. Milk products can cause diarrhea in people who are lactose intolerant. You should also drink extra liquids to replace fluids that are lost when you have diarrhea. Diarrhea can lead to dehydration. DISCHARGE INSTRUCTIONS:   Foods to limit or avoid:   Dairy:      Whole milk    Half-and-half, cream, and sour cream    Regular (whole milk) ice cream    Grains:      Whole wheat and whole grain breads, pasta, cereals, and crackers    Brown and wild rice    Breads and cereals with seeds or nuts    Popcorn    Fruit and vegetables: All raw fruits, except bananas and melon    Dried fruits, including prunes and raisins    Canned fruit in heavy syrup    Prune juice and any fruit juice with pulp    Raw vegetables, except lettuce     Fried vegetables    Corn, raw and cooked broccoli, cabbage, cauliflower, and fabiola greens    Protein:      Fried meat, poultry, and fish    High-fat luncheon meats, such as bologna    Fatty meats, such as sausage, ann, and hot dogs    Beans and nuts    Liquids:      Sodas and fruit-flavored drinks    Drinks that contain caffeine, such as energy drinks, coffee, and tea     Drinks that contain alcohol or sugar alcohol, such as sorbitol    Foods and liquids you may eat or drink:  Most people can tolerate the foods and liquids listed below. If any of them make your symptoms worse, stop eating or drinking them until you feel better. If you are lactose intolerant, avoid milk products.   Dairy:      Skim or low-fat milk or evaporated milk    Soy milk or buttermilk     Low-fat, part-skim, and aged cheese    Yogurt, low-fat ice cream, or sherbert    Grains:  (Choose foods with less than 2 grams of dietary fiber per serving.)     White or refined flour breads, bagels, pasta, and crackers    Cold or hot cereals made from white or refined flour such as puffed rice, cornflakes, or cream of wheat    White rice    Fruit and vegetables: Bananas or melon    Fruit juice without pulp, except prune juice    Canned fruit in juice or light syrup    Lettuce and most well-cooked vegetables without seeds or skins     Strained vegetable juice    Protein:      Tender, well-cooked meat, poultry, or fish    Well-cooked eggs or soy foods (cooked without added fat)    Smooth nut butters    Fats:  (Limit fats to less than 8 teaspoons a day)     Oil, butter, or margarine, or mayonnaise    Cream cheese or salad dressings    Liquids: For infants, breast milk or formula    Oral rehydration solution     Decaffeinated coffee or caffeine-free teas    Soft drinks without caffeine    Other guidelines to follow:   Drink liquids as directed. You may need to drink more liquids than usual to prevent dehydration. Ask how much liquid to drink each day and which liquids are best for you. You may need to drink an oral rehydration solution (ORS). An ORS helps replace fluids and electrolytes that you lose when you have diarrhea. Eat small meals or snacks every 3 to 4 hours  instead of large meals. Continue eating even if you still have diarrhea. Your diarrhea will continue for a few days but should gradually go away. © Copyright Elisha Goltz 2023 Information is for End User's use only and may not be sold, redistributed or otherwise used for commercial purposes. The above information is an  only. It is not intended as medical advice for individual conditions or treatments. Talk to your doctor, nurse or pharmacist before following any medical regimen to see if it is safe and effective for you.

## 2023-10-31 NOTE — PATIENT INSTRUCTIONS
Avoid dairy x 1-2 weeks  Clear liquid to bland diet as tolerated. No greasy, spicy or acidic foods/drinks. Schedule a follow-up appointment with your primary care physician for recheck in 2-3 days. If you cannot see your PCP, you can schedule a follow up appointment at a [facility]. Go to the emergency department if you develop any new or worsening symptoms including fever, worsening pain, or anything else that is concerning. Excuses can be found in "Letters" section of [employer] joann. Can print if opened from a [employer] [address] phone number is [phone] if you need assistance or have further questions    1 21 [phone] ([phone])  Schedule or Reschedule Outpatient Testing - Option 2  Billing - Option 3  General Info - Option 4  [employer] Help - Option 5  Comprehensive Spine Program - Option 6   COVID - Option 7    Nutrition Tips for Relief of Diarrhea   WHAT YOU NEED TO KNOW:   There are diet changes you can make to help relieve or stop diarrhea. These changes include limiting or avoiding foods and liquids that are high in sugar, fat, fiber, and lactose. Lactose is a sugar found in milk products. Milk products can cause diarrhea in people who are lactose intolerant. You should also drink extra liquids to replace fluids that are lost when you have diarrhea. Diarrhea can lead to dehydration. DISCHARGE INSTRUCTIONS:   Foods to limit or avoid:   Dairy:      Whole milk    Half-and-half, cream, and sour cream    Regular (whole milk) ice cream    Grains:      Whole wheat and whole grain breads, pasta, cereals, and crackers    Brown and wild rice    Breads and cereals with seeds or nuts    Popcorn    Fruit and vegetables:       All raw fruits, except bananas and melon    Dried fruits, including prunes and raisins    Canned fruit in heavy syrup    Prune juice and any fruit juice with pulp    Raw vegetables, except lettuce     Fried vegetables    Corn, raw and cooked broccoli, cabbage, cauliflower, and fabiola greens    Protein:      Fried meat, poultry, and fish    High-fat luncheon meats, such as bologna    Fatty meats, such as sausage, ann, and hot dogs    Beans and nuts    Liquids:      Sodas and fruit-flavored drinks    Drinks that contain caffeine, such as energy drinks, coffee, and tea     Drinks that contain alcohol or sugar alcohol, such as sorbitol    Foods and liquids you may eat or drink:  Most people can tolerate the foods and liquids listed below. If any of them make your symptoms worse, stop eating or drinking them until you feel better. If you are lactose intolerant, avoid milk products. Dairy:      Skim or low-fat milk or evaporated milk    Soy milk or buttermilk     Low-fat, part-skim, and aged cheese    Yogurt, low-fat ice cream, or sherbert    Grains:  (Choose foods with less than 2 grams of dietary fiber per serving.)     White or refined flour breads, bagels, pasta, and crackers    Cold or hot cereals made from white or refined flour such as puffed rice, cornflakes, or cream of wheat    White rice    Fruit and vegetables:      Bananas or melon    Fruit juice without pulp, except prune juice    Canned fruit in juice or light syrup    Lettuce and most well-cooked vegetables without seeds or skins     Strained vegetable juice    Protein:      Tender, well-cooked meat, poultry, or fish    Well-cooked eggs or soy foods (cooked without added fat)    Smooth nut butters    Fats:  (Limit fats to less than 8 teaspoons a day)     Oil, butter, or margarine, or mayonnaise    Cream cheese or salad dressings    Liquids: For infants, breast milk or formula    Oral rehydration solution     Decaffeinated coffee or caffeine-free teas    Soft drinks without caffeine    Other guidelines to follow:   Drink liquids as directed. You may need to drink more liquids than usual to prevent dehydration. Ask how much liquid to drink each day and which liquids are best for you.  You may need to drink an oral rehydration solution (ORS). An ORS helps replace fluids and electrolytes that you lose when you have diarrhea. Eat small meals or snacks every 3 to 4 hours  instead of large meals. Continue eating even if you still have diarrhea. Your diarrhea will continue for a few days but should gradually go away. © Copyright [employer] 2023 Information is for End User's use only and may not be sold, redistributed or otherwise used for commercial purposes. The above information is an  only. It is not intended as medical advice for individual conditions or treatments. Talk to your doctor, nurse or pharmacist before following any medical regimen to see if it is safe and effective for you.

## 2023-10-31 NOTE — CARE ANYWHERE EVISITS
Visit Summary for [place] THOM[facility] - Gender: Female - Date of Birth: [birth year]  Date: [id number] - Duration: 13 minutes  Patient: St. [email or web address] [email or web address]  Provider: Sharan William PA-C    Patient Contact Information  Address  [address]; [address] [facility]  [phone]    Visit Topics  Stomachache [Added By: Self - 2023-10-31]    Triage Questions   What is your current physical address in the event of a medical emergency? Answer []  Are you allergic to any medications? Answer []  Are you now or could you be pregnant? Answer []  Do you have any immune system compromise or chronic lung   disease? Answer []  Do you have any vulnerable family members in the home (infant, pregnant, cancer, elderly)? Answer []     Conversation Transcripts  [0A][0A] [Notification] You are connected with Sharan William PA-C, Urgent Care Specialist.[0A][Notification] Alvin Conklin is located in [place]. [0A][Notification] Alvin Conklin has shared health history. Rosi aPtel .[0A]    Diagnosis  Infectious gastroenteritis and colitis, unspecified    Procedures  Value: [id number] Code: CPT-4 UNLISTED E&M SERVICE    Medications Prescribed    No prescriptions ordered    Electronically signed by: Jessica Aguilar(NPI [id number])

## 2023-12-20 ENCOUNTER — TELEPHONE (OUTPATIENT)
Dept: OBGYN CLINIC | Facility: CLINIC | Age: 23
End: 2023-12-20

## 2023-12-20 NOTE — TELEPHONE ENCOUNTER
Appointment For: Tamara Jenkins ([id number])  Visit Type: [facility] ([id number])     3/11/2024    9:00 AM  15 mins.  Julieta Fry        [facility]     Patient Comments:  General Vaginal Concerns. Is this a new concern: Yes.  How long have you had Symptoms?  Last few days. What symptoms do you have: Discharge, Odor, Burning

## 2023-12-27 ENCOUNTER — AMB VIDEO VISIT (OUTPATIENT)
Dept: OTHER | Facility: HOSPITAL | Age: 23
End: 2023-12-27

## 2023-12-27 DIAGNOSIS — J40 BRONCHITIS: Primary | ICD-10-CM

## 2023-12-27 PROCEDURE — ECARE PR SL URGENT CARE VIRTUAL VISIT: Performed by: PHYSICIAN ASSISTANT

## 2023-12-27 RX ORDER — ALBUTEROL SULFATE 90 UG/1
2 AEROSOL, METERED RESPIRATORY (INHALATION) EVERY 6 HOURS PRN
Qty: 6.7 G | Refills: 0 | Status: SHIPPED | OUTPATIENT
Start: 2023-12-27

## 2023-12-27 RX ORDER — AZITHROMYCIN 250 MG/1
TABLET, FILM COATED ORAL
Qty: 6 TABLET | Refills: 0 | Status: SHIPPED | OUTPATIENT
Start: 2023-12-27 | End: 2024-01-01

## 2023-12-27 RX ORDER — PREDNISONE 10 MG/1
TABLET ORAL
Qty: 21 TABLET | Refills: 0 | Status: SHIPPED | OUTPATIENT
Start: 2023-12-27

## 2023-12-27 NOTE — PROGRESS NOTES
Required Documentation:  Encounter provider Sada Pak PA-C    Provider located at [facility]  VIRTUAL CARE   [address] [facility] [id number]    Identify all parties in room with patient during virtual visit:  No one else    The patient was identified by name and date of birth. Tamara Jenkins was informed that this is a telemedicine visit and that the visit is being conducted through the [employer] platform. She agrees to proceed..  My office door was closed. No one else was in the room.  She acknowledged consent and understanding of privacy and security of the video platform. The patient has agreed to participate and understands they can discontinue the visit at any time.    Verification of patient location:    Patient is located at home in the following state in which I hold an active license [place]    Patient is aware this is a billable service.     Reason for visit is No chief complaint on file.       Subjective  HPI   Patient states that she has a cough that is keeping her up and wheezing at night.  Her right side of her throat really hurts.  She had a fever two nights ago.  Her cough started a week ago and getting worse. The wheezing started 4-5 days ago.  She had a negative covid test.  She denies sick contacts.  She is couging up green mucus.      Past Medical History:   Diagnosis Date    Acute pyelonephritis 07/03/2019    Concussion with no loss of consciousness 03/18/2019    Head injury     Tendonitis     R ankle    Tonsillitis     Urinary tract infection        Past Surgical History:   Procedure Laterality Date    TONSILLECTOMY      TONSILLECTOMY      WISDOM TOOTH EXTRACTION          No Known Allergies    Review of Systems   Constitutional:  Positive for fever.   HENT:  Positive for congestion, sinus pressure, sinus pain and sore throat.    Respiratory:  Positive for cough and wheezing.    Musculoskeletal: Negative.    Neurological: Negative.     Psychiatric/Behavioral: Negative.         Video Exam    There were no vitals filed for this visit.    Physical Exam  Constitutional:       General: She is not in acute distress.     Appearance: Normal appearance. She is not ill-appearing, toxic-appearing or diaphoretic.   HENT:      Nose: Congestion present.      Mouth/Throat:      Pharynx: Posterior oropharyngeal erythema present. No oropharyngeal exudate.   Pulmonary:      Effort: Pulmonary effort is normal. No respiratory distress.   Lymphadenopathy:      Cervical: Cervical adenopathy present.   Skin:     General: Skin is dry.   Neurological:      General: No focal deficit present.      Mental Status: She is alert and oriented to person, place, and time.   Psychiatric:         Mood and Affect: Mood normal.         Behavior: Behavior normal.         Visit Time  Total Visit Duration: 5 minutes    Assessment/Plan:    Diagnoses and all orders for this visit:    Bronchitis  -     azithromycin (ZITHROMAX) 250 mg tablet; Take 2 tablets today then 1 tablet daily x 4 days  -     predniSONE 10 mg tablet; [id number],[id number],3yqgpy1ejs,6jtxxg8mgs,1ytpqs8gpl,1eyop8kgn  -     albuterol (Proventil HFA) 90 mcg/act inhaler; Inhale 2 puffs every 6 (six) hours as needed for wheezing        Patient Instructions   Acute Bronchitis   WHAT YOU NEED TO KNOW:   Acute bronchitis is swelling and irritation in your lungs. It is usually caused by a virus and most often happens in the winter. Bronchitis may also be caused by bacteria or by a chemical irritant, such as smoke.  DISCHARGE INSTRUCTIONS:   Return to the emergency department if:   You cough up blood.    Your lips or fingernails turn blue.    You feel like you are not getting enough air when you breathe.    Call your doctor if:   Your symptoms do not go away or get worse, even after treatment.    Your cough does not get better within 4 weeks.    You have questions or concerns about your condition or care.    Medicines:  You may  need any of the following:  Cough suppressants  decrease your urge to cough.    Decongestants  help loosen mucus in your lungs and make it easier to cough up. This can help you breathe easier.    Inhalers  may be given. Your healthcare provider may give you one or more inhalers to help you breathe easier and cough less. An inhaler gives you medicine to open your airways. Ask your healthcare provider to show you how to use your inhaler correctly.         Antiviral medicine  treats infections caused by a virus.    Antibiotics  may be given if your bronchitis is caused by bacteria or if you have lung condition.    Acetaminophen  decreases pain and fever. It is available without a doctor's order. Ask how much to take and how often to take it. Follow directions. Read the labels of all other medicines you are using to see if they also contain acetaminophen, or ask your doctor or pharmacist. Acetaminophen can cause liver damage if not taken correctly.    NSAIDs  help decrease swelling and pain or fever. This medicine is available with or without a doctor's order. NSAIDs can cause stomach bleeding or kidney problems in certain people. If you take blood thinner medicine, always ask your healthcare provider if NSAIDs are safe for you. Always read the medicine label and follow directions.    Self-care:   Drink liquids as directed.  You may need to drink more liquids than usual to stay hydrated. Ask how much liquid to drink each day and which liquids are best for you.    Use a cool mist humidifier.  This increases air moisture in your home. This may make it easier for you to breathe and help decrease your cough.     Get more rest.  Rest helps your body to heal. Slowly start to do more each day. Rest when you feel it is needed.    Prevent acute bronchitis:       Ask about vaccines you may need.  Get a flu vaccine each year as soon as recommended, usually in September or October. Ask your healthcare provider if you should also  get a pneumonia or COVID-19 vaccine. Your healthcare provider can tell you if you should also get other vaccines, and when to get them.    Prevent the spread of germs.  You can decrease your risk for acute bronchitis and other illnesses by doing the following:     Wash your hands often with soap and water. Carry germ-killing hand lotion or gel with you. You can use the lotion or gel to clean your hands when soap and water are not available.         Do not touch your eyes, nose, or mouth unless you have washed your hands first.    Always cover your mouth when you cough to prevent the spread of germs. It is best to cough into a tissue or your shirt sleeve instead of into your hand. Ask those around you to cover their mouths when they cough.    Try to avoid people who have a cold or the flu. If you are sick, stay away from others as much as possible.    Avoid irritants in the air.  Avoid chemicals, fumes, and dust. Wear a face mask if you must work around dust or fumes. Stay inside on days when air pollution levels are high. If you have allergies, stay inside when pollen counts are high. Do not use aerosol products, such as spray-on deodorant, bug spray, and hair spray.    Do not smoke or be around others who are smoking.  Nicotine and other chemicals in cigarettes and cigars can cause lung damage. Ask your healthcare provider for information if you currently smoke and need help to quit. E-cigarettes or smokeless tobacco still contain nicotine. Talk to your healthcare provider before you use these products.  Follow up with your doctor as directed:  Write down questions you have so you will remember to ask them during your follow-up visits.  © Copyright [employer] 2023 Information is for End User's use only and may not be sold, redistributed or otherwise used for commercial purposes.  The above information is an  only. It is not intended as medical advice for individual conditions or treatments. Talk to  your doctor, nurse or pharmacist before following any medical regimen to see if it is safe and effective for you.

## 2023-12-27 NOTE — CARE ANYWHERE EVISITS
Visit Summary for SHERRELL THOMAS - Gender: Female - Date of Birth: 2000  Date: [id number] - Duration: 4 minutes  Patient: SHERRELL THOMAS  Provider: Sada Pak PA-C    Patient Contact Information  Address  [address] [place] [address] [address] [place]; [place] [address]  [phone]    Visit Topics  Headache [Added By: Self - 2023-12-27]  Flu-Like Symptoms [Added By: Self - 2023-12-27]  Fever [Added By: Self - 2023-12-27]    Triage Questions   What is your current physical address in the event of a medical emergency? Answer []  Are you allergic to any medications? Answer []  Are you now or could you be pregnant? Answer []  Do you have any immune system compromise or chronic lung   disease? Answer []  Do you have any vulnerable family members in the home (infant, pregnant, cancer, elderly)? Answer []     Conversation Transcripts  [0A][0A] [Notification] You are connected with Sada Pak PA-C, Urgent Care Specialist.[0A][Notification] SHERRELL THOMAS is located in [place].[0A][Notification] SHERRELL THOMAS has shared health history...[0A]    Diagnosis  Bronchitis, not specified as acute or chronic    Procedures  Value: 91620 Code: CPT-4 UNLISTED E&M SERVICE    Medications Prescribed    No prescriptions ordered    Electronically signed by: Sada Pak PA-C(NPI [id number])

## 2024-04-23 ENCOUNTER — OFFICE VISIT (OUTPATIENT)
Dept: FAMILY MEDICINE CLINIC | Facility: CLINIC | Age: 24
End: 2024-04-23
Payer: COMMERCIAL

## 2024-04-23 VITALS
BODY MASS INDEX: 27.34 KG/M2 | RESPIRATION RATE: 14 BRPM | HEART RATE: 69 BPM | TEMPERATURE: 96.7 F | DIASTOLIC BLOOD PRESSURE: 80 MMHG | HEIGHT: 62 IN | WEIGHT: 148.6 LBS | SYSTOLIC BLOOD PRESSURE: 98 MMHG | OXYGEN SATURATION: 99 %

## 2024-04-23 DIAGNOSIS — R23.3 EASY BRUISING: ICD-10-CM

## 2024-04-23 DIAGNOSIS — Z00.00 ENCOUNTER FOR ANNUAL PHYSICAL EXAM: Primary | ICD-10-CM

## 2024-04-23 DIAGNOSIS — F32.81 PMDD (PREMENSTRUAL DYSPHORIC DISORDER): ICD-10-CM

## 2024-04-23 DIAGNOSIS — R68.89 COLD INTOLERANCE: ICD-10-CM

## 2024-04-23 DIAGNOSIS — Z11.59 ENCOUNTER FOR HEPATITIS C SCREENING TEST FOR LOW RISK PATIENT: ICD-10-CM

## 2024-04-23 PROCEDURE — 99204 OFFICE O/P NEW MOD 45 MIN: CPT | Performed by: FAMILY MEDICINE

## 2024-04-23 PROCEDURE — 99385 PREV VISIT NEW AGE 18-39: CPT | Performed by: FAMILY MEDICINE

## 2024-04-23 RX ORDER — ESCITALOPRAM OXALATE 5 MG/1
5 TABLET ORAL DAILY
Qty: 30 TABLET | Refills: 1 | Status: SHIPPED | OUTPATIENT
Start: 2024-04-23 | End: 2024-05-07

## 2024-04-23 NOTE — PROGRESS NOTES
ADULT ANNUAL PHYSICAL  [facility]    NAME: Tamara Jenkins  AGE: 23 y.o. SEX: female  : 2000     DATE: 2024     Assessment and Plan:     Problem List Items Addressed This Visit    None  Visit Diagnoses       Encounter for annual physical exam    -  Primary    New patient. Lost 45 lbs with diet and exercise! Works out daily. Due for pap and seeing gynecology. Hep c screen consented    PMDD (premenstrual dysphoric disorder)        Reports increased irritability, anxiety, anger outburst, headaches, and social withrawal 2-3 weeks prior to her period. Scored 9 on GAD7 and 9 on Phq9. Symptoms worsened with OCPs in the past. I explained the first line treatments are SSRIs. Lexapro ordered as its the most weight neutral. S/e reviewed. Did mention that venlafaxine would be another options and could also help to reduce her migraines which also occur around her periods. RTC in 2 months to assess for s/e and efficacy.    Relevant Medications    escitalopram (LEXAPRO) 5 mg tablet    Easy bruising        Chronic. Labs ordered to further investigate    Relevant Orders    CBC and differential    Protime-INR    APTT    Comprehensive metabolic panel    Cold intolerance        Possibly anemia or thyroid dysfunction. Labs ordered    Relevant Orders    Iron Panel (Includes Ferritin, Iron Sat%, Iron, and TIBC)    TSH, 3rd generation with Free T4 reflex    Comprehensive metabolic panel    Encounter for hepatitis C screening test for low risk patient        Relevant Orders    Hepatitis C antibody            Immunizations and preventive care screenings were discussed with patient today. Appropriate education was printed on patient's after visit summary.    Counseling:  Dental Health: discussed importance of regular tooth brushing, flossing, and dental visits.  Exercise: the importance of regular exercise/physical activity was discussed. Recommend exercise 3-5 times per  week for at least 30 minutes.          No follow-ups on file.     Chief Complaint:     Chief Complaint   Patient presents with    Physical Exam    Care Gap Request     PHQ      History of Present Illness:     Adult Annual Physical   Patient here as a new patient for a comprehensive physical exam. I see her mother. The patient reports problems - anxiety and depressive symptoms 2-3 weeks prior to her periods. Also reports easy bruising which has been chronic .    Mood lability: Worse before her periods. Started years ago. Did discuss with gynecologist and prescribed OCP. Symptoms were worse on the birth control and eventually stopped it. Periods are normal in frequency, occur every 28-30 days. She will also experience headaches and increased appetite.       Bruising: Chronic. Notes bruising with minor trauma. Denies history of nose bleeds, heavy periods, or FH of bleeding ds. Of note, mother adopted. Not taking any supplements or aspirin products      GABRIELLA-7 Flowsheet Screening      Flowsheet Row Most Recent Value   Over the last 2 weeks, how often have you been bothered by any of the following problems?    Feeling nervous, anxious, or on edge 2   Not being able to stop or control worrying 1   Worrying too much about different things 2   Trouble relaxing 0   Being so restless that it is hard to sit still 2   Becoming easily annoyed or irritable 2   Feeling afraid as if something awful might happen 0   GABRIELLA-7 Total Score 9              Diet and Physical Activity  Diet/Nutrition: well balanced diet.   Exercise:  exercises daily .      Depression Screening  PHQ-2/9 Depression Screening    Little interest or pleasure in doing things: 1 - several days  Feeling down, depressed, or hopeless: 1 - several days  Trouble falling or staying asleep, or sleeping too much: 2 - more than half the days  Feeling tired or having little energy: 2 - more than half the days  Poor appetite or overeatin - more than half the days  Feeling  bad about yourself - or that you are a failure or have let yourself or your family down: 1 - several days  Trouble concentrating on things, such as reading the newspaper or watching television: 0 - not at all  Moving or speaking so slowly that other people could have noticed. Or the opposite - being so fidgety or restless that you have been moving around a lot more than usual: 0 - not at all  Thoughts that you would be better off dead, or of hurting yourself in some way: 0 - not at all  PHQ-2 Score: 2  PHQ-2 Interpretation: Negative depression screen  PHQ-9 Score: 9  PHQ-9 Interpretation: Mild depression       General Health  Sleep:  6-8 hours .   Hearing: normal - bilateral.  Vision: no vision problems.   Dental:  seen regularly .       /GYN Health  Follows with gynecology? yes   Last menstrual period: 3rd of March  Contraceptive method:  none .  History of STDs?: no.      Review of Systems:     Review of Systems   Past Medical History:     Past Medical History:   Diagnosis Date    Acute pyelonephritis 07/03/2019    Concussion with no loss of consciousness 03/18/2019    Head injury     Orbital fracture (HCC)     right eye during softball    Tendonitis     R ankle    Tonsillitis     Urinary tract infection       Past Surgical History:     Past Surgical History:   Procedure Laterality Date    TONSILLECTOMY      TONSILLECTOMY      WISDOM TOOTH EXTRACTION        Social History:     Social History     Socioeconomic History    Marital status: Single     Spouse name: None    Number of children: 0    Years of education: None    Highest education level: Some college, no degree   Occupational History    Occupation: Cosmotlogist at [employer]   Tobacco Use    Smoking status: Never    Smokeless tobacco: Never   Vaping Use    Vaping status: Never Used   Substance and Sexual Activity    Alcohol use: Never     Comment: socially    Drug use: Never    Sexual activity: Yes     Partners: Male     Birth control/protection: Condom Male  "    Comment: Declines std testing    Other Topics Concern    None   Social History Narrative    None     Social Determinants of Health     Financial Resource Strain: Not on file   Food Insecurity: Not on file   Transportation Needs: Not on file   Physical Activity: Not on file   Stress: Not on file   Social Connections: Not on file   Intimate Partner Violence: Not on file   Housing Stability: Not on file      Family History:     Family History   Problem Relation Age of Onset    No Known Problems Mother         adopted, frequent UTI's as a teen.    No Known Problems Father     Cancer Paternal Grandmother     Cancer Paternal Grandfather       Current Medications:     Current Outpatient Medications   Medication Sig Dispense Refill    albuterol (Proventil HFA) 90 mcg/act inhaler Inhale 2 puffs every 6 (six) hours as needed for wheezing 6.7 g 0    dicyclomine (BENTYL) 10 mg capsule Take 1 capsule (10 mg total) by mouth 3 (three) times a day before meals 90 capsule 3    escitalopram (LEXAPRO) 5 mg tablet Take 1 tablet (5 mg total) by mouth daily 30 tablet 1    ondansetron (ZOFRAN) 4 mg tablet Take 1 tablet (4 mg total) by mouth every 8 (eight) hours as needed for nausea or vomiting 20 tablet 0     No current facility-administered medications for this visit.      Allergies:     No Known Allergies   Physical Exam:     BP 98/80 (BP Location: Left arm, Patient Position: Sitting, Cuff Size: Adult)   Pulse 69   Temp (!) 96.7 °F (35.9 °C) (Tympanic)   Resp 14   Ht 5' 2\" (1.575 m)   Wt 67.4 kg (148 lb 9.6 oz)   SpO2 99%   BMI 27.18 kg/m²     Physical Exam  Constitutional:       General: She is not in acute distress.     Appearance: Normal appearance. She is not ill-appearing or toxic-appearing.   HENT:      Head: Normocephalic and atraumatic.      Right Ear: Tympanic membrane normal.      Left Ear: Tympanic membrane normal.      Mouth/Throat:      Mouth: Mucous membranes are moist.   Eyes:      General:         Right eye: " No discharge.         Left eye: No discharge.      Extraocular Movements: Extraocular movements intact.   Cardiovascular:      Rate and Rhythm: Normal rate and regular rhythm.      Heart sounds: No murmur heard.  Pulmonary:      Effort: Pulmonary effort is normal. No respiratory distress.      Breath sounds: Normal breath sounds. No stridor. No wheezing, rhonchi or rales.   Abdominal:      General: There is no distension.      Palpations: Abdomen is soft. There is no mass.      Tenderness: There is no abdominal tenderness. There is no guarding or rebound.      Hernia: No hernia is present.   Musculoskeletal:      Right lower leg: No edema.      Left lower leg: No edema.   Lymphadenopathy:      Cervical: No cervical adenopathy.   Skin:     General: Skin is warm.      Coloration: Skin is not pale.      Findings: Bruising (on the inner left knee) present.   Neurological:      Mental Status: She is alert and oriented to person, place, and time.   Psychiatric:         Behavior: Behavior normal.          MD CURTIS Miller [facility]

## 2024-04-30 LAB
ALBUMIN SERPL-MCNC: 4.5 G/DL (ref 3.5–5.7)
ALP SERPL-CCNC: 59 U/L (ref 35–120)
ALT SERPL-CCNC: 21 U/L
ANION GAP SERPL CALCULATED.3IONS-SCNC: 9 MMOL/L (ref 3–11)
APTT PPP: 27.3 SEC (ref 21.6–35.6)
AST SERPL-CCNC: 21 U/L
BASOPHILS # BLD AUTO: 0.1 THOU/CMM (ref 0–0.1)
BASOPHILS NFR BLD AUTO: 1 %
BILIRUB SERPL-MCNC: 0.8 MG/DL (ref 0.2–1)
BUN SERPL-MCNC: 13 MG/DL (ref 7–25)
CALCIUM SERPL-MCNC: 9.7 MG/DL (ref 8.5–10.1)
CHLORIDE SERPL-SCNC: 103 MMOL/L (ref 100–109)
CO2 SERPL-SCNC: 26 MMOL/L (ref 21–31)
CREAT SERPL-MCNC: 0.8 MG/DL (ref 0.4–1.1)
CYTOLOGY CMNT CVX/VAG CYTO-IMP: NORMAL
DIFFERENTIAL METHOD BLD: NORMAL
EOSINOPHIL # BLD AUTO: 0.2 THOU/CMM (ref 0–0.5)
EOSINOPHIL NFR BLD AUTO: 2 %
ERYTHROCYTE [DISTWIDTH] IN BLOOD BY AUTOMATED COUNT: 13.2 % (ref 12–16)
GFR/BSA.PRED SERPLBLD CYS-BASED-ARV: 107 ML/MIN/{1.73_M2}
GLUCOSE SERPL-MCNC: 84 MG/DL (ref 65–99)
HCT VFR BLD AUTO: 40.2 % (ref 35–43)
HCV AB SERPL QL IA: NONREACTIVE
HGB BLD-MCNC: 13.8 G/DL (ref 11.5–14.5)
INR PPP: 1.1
LYMPHOCYTES # BLD AUTO: 2.1 THOU/CMM (ref 1–3)
LYMPHOCYTES NFR BLD AUTO: 21 %
MCH RBC QN AUTO: 31.1 PG (ref 26–34)
MCHC RBC AUTO-ENTMCNC: 34.2 G/DL (ref 32–37)
MCV RBC AUTO: 91 FL (ref 80–100)
MONOCYTES # BLD AUTO: 0.5 THOU/CMM (ref 0.3–1)
MONOCYTES NFR BLD AUTO: 5 %
NEUTROPHILS # BLD AUTO: 6.9 THOU/CMM (ref 1.8–7.8)
NEUTROPHILS NFR BLD AUTO: 71 %
PLATELET # BLD AUTO: 302 THOU/CMM (ref 140–350)
PMV BLD REES-ECKER: 9.4 FL (ref 7.5–11.3)
POTASSIUM SERPL-SCNC: 4.2 MMOL/L (ref 3.5–5.2)
PROT SERPL-MCNC: 7.1 G/DL (ref 6.3–8.3)
PROTHROMBIN TIME: 13.7 SEC (ref 12–14.6)
RBC # BLD AUTO: 4.42 MILL/CMM (ref 3.7–4.7)
SODIUM SERPL-SCNC: 138 MMOL/L (ref 135–145)
TSH SERPL-ACNC: 1.74 UIU/ML (ref 0.45–5.33)
WBC # BLD AUTO: 9.7 THOU/CMM (ref 4–10)

## 2024-05-07 DIAGNOSIS — F32.81 PMDD (PREMENSTRUAL DYSPHORIC DISORDER): ICD-10-CM

## 2024-05-07 RX ORDER — ESCITALOPRAM OXALATE 5 MG/1
5 TABLET ORAL DAILY
Qty: 90 TABLET | Refills: 1 | Status: SHIPPED | OUTPATIENT
Start: 2024-05-07

## 2025-01-14 NOTE — PROGRESS NOTES
Assessment & Plan   Diagnoses and all orders for this visit:    Encounter for gynecological examination (general) (routine) with abnormal findings  -     Liquid-based pap, screening  -     Chlamydia/GC amplified DNA by PCR  The current ASCCP guidelines were reviewed. Patient's last pap was none and therefore, a pap is indicated at this time. I emphasized the importance of an annual pelvic and breast exam. Patient ok to have a pap done today.    Screening for STD (sexually transmitted disease)  -     Chlamydia/GC amplified DNA by PCR  -     Hepatitis B surface antigen; Future  -     Hepatitis C antibody; Future  -     HIV 1/2 AG/AB w Reflex [facility] for 2 yr old and above; Future  -     RPR-Syphilis Screening (Total Syphilis IGG/IGM); Future  Encourage safe sexual practices and testing with new sexual partners; STI testing - C/G collected; STI labs ordered    Dysmenorrhea  Encourage trial of ibuprofen 600 mg q 8 hours as needed for menstrual cramps; Advise her to start NSAIDs prior to cramps getting bad. If cramps persist/worsen/desire to manage further - encouraged her to consider hormonal therapy. Reviewed Mirena IUD as prog only and minimal systemic absorption - may help lesson cramps    Discussion  I have discussed the importance of monthly self-breast exams, exercise and healthy diet as well as adequate intake of calcium and vitamin D. Encourage MVI q day and r/toña importance of folic acid; Encourage 30-40 min weight bearing exercise most days of week  Contraception - condoms; not interested in alternate BC options at this time; Reviewed Paragard IUD as non-hormonal option if she desire to avoid hormones, however, may cause heavy, crampy periods. The patient has had the Gardasil vaccine series, which is recommended for patients from 9-45 years of age.   All questions have been answered to her satisfaction  RTO for APE or sooner if needed    Subjective     HPI   Tamara Jenkins is a 24 y.o. female who presents for  annual well woman exam.   LMP - 1/5/25; Periods are reg q month and last 5 days; No excessive bleeding; No intermenstrual bleeding or spotting; Cramps are intense - used to take Midol but doesn't seem to be helping;   No vulvar itch/burn; No vaginal itch/burn; No abn discharge or odor; No urinary sx - burning/pain/frequency/hematuria  (+) SBEs - no breast masses, asymmetry, nipple discharge or bleeding, changes in skin of breast, or breast tenderness bilaterally  No abd/pelvic pain or HAs;   Pt is not currently sexually active; last encounter 3w ago; No issues with intercourse; She is ok to have sti/hiv/hep testing; Feels safe at home  Current contraception: condoms  Gardasil - completed  (+) PCP for routine Bw/care;    Last Pap - none  History of abnormal Pap smear:   Last STI screen - none    Review of Systems   Constitutional:  Negative for activity change, fatigue, fever and unexpected weight change.   HENT:  Negative for congestion, dental problem, sinus pressure and sinus pain.    Eyes:  Negative for visual disturbance.   Respiratory:  Negative for cough, shortness of breath and wheezing.    Cardiovascular:  Negative for chest pain and leg swelling.   Gastrointestinal:  Negative for abdominal distention, abdominal pain, blood in stool, constipation, diarrhea, nausea and vomiting.   Endocrine: Negative for polydipsia.   Genitourinary:  Negative for difficulty urinating, dyspareunia, dysuria, frequency, hematuria, menstrual problem, pelvic pain, urgency, vaginal bleeding, vaginal discharge and vaginal pain.   Musculoskeletal:  Negative for arthralgias and back pain.   Allergic/Immunologic: Negative for environmental allergies.   Neurological:  Negative for dizziness, seizures and headaches.   Psychiatric/Behavioral:  Negative for dysphoric mood and sleep disturbance. The patient is not nervous/anxious.        The following portions of the patient's history were reviewed and updated as appropriate: allergies,  current medications, past family history, past medical history, past social history, past surgical history, and problem list.         OB History          0    Para   0    Term   0       0    AB   0    Living   0         SAB   0    IAB   0    Ectopic   0    Multiple   0    Live Births   0                 Past Medical History:   Diagnosis Date    Acute pyelonephritis 2019    Concussion with no loss of consciousness 2019    Head injury     Orbital fracture (HCC)     right eye during softball    Tendonitis     R ankle    Tonsillitis     Urinary tract infection        Past Surgical History:   Procedure Laterality Date    TONSILLECTOMY      TONSILLECTOMY      WISDOM TOOTH EXTRACTION         Family History   Problem Relation Age of Onset    No Known Problems Mother         adopted, frequent UTI's as a teen.    No Known Problems Father     Cancer Paternal Grandmother     Cancer Paternal Grandfather        Social History     Socioeconomic History    Marital status: Single     Spouse name: Not on file    Number of children: 0    Years of education: Not on file    Highest education level: Some college, no degree   Occupational History    Occupation: Cosmotlogist at [employer]   Tobacco Use    Smoking status: Never    Smokeless tobacco: Never   Vaping Use    Vaping status: Never Used   Substance and Sexual Activity    Alcohol use: Yes     Comment: socially    Drug use: Never    Sexual activity: Not Currently     Partners: Male     Comment: Declines std testing    Other Topics Concern    Not on file   Social History Narrative    Not on file     Social Drivers of Health     Financial Resource Strain: Not on file   Food Insecurity: Not on file   Transportation Needs: Not on file   Physical Activity: Not on file   Stress: Not on file   Social Connections: Not on file   Intimate Partner Violence: Not on file   Housing Stability: Not on file         Current Outpatient Medications:     albuterol (Proventil HFA)  "90 mcg/act inhaler, Inhale 2 puffs every 6 (six) hours as needed for wheezing, Disp: 6.7 g, Rfl: 0    MULTIPLE VITAMINS PO, Take by mouth, Disp: , Rfl:     ondansetron (ZOFRAN) 4 mg tablet, Take 1 tablet (4 mg total) by mouth every 8 (eight) hours as needed for nausea or vomiting, Disp: 20 tablet, Rfl: 0    No Known Allergies    Objective   Vitals:    01/15/25 0832   BP: 108/72   BP Location: Left arm   Patient Position: Sitting   Cuff Size: Standard   Weight: 68.9 kg (152 lb)   Height: 5' 2\" (1.575 m)     Physical Exam  Vitals reviewed.   Constitutional:       General: She is awake. She is not in acute distress.     Appearance: Normal appearance. She is well-developed and well-groomed. She is not ill-appearing, toxic-appearing or diaphoretic.   HENT:      Head: Normocephalic and atraumatic.   Eyes:      Conjunctiva/sclera: Conjunctivae normal.   Neck:      Thyroid: No thyroid mass, thyromegaly or thyroid tenderness.   Cardiovascular:      Rate and Rhythm: Normal rate and regular rhythm.      Heart sounds: Normal heart sounds. No murmur heard.  Pulmonary:      Effort: Pulmonary effort is normal. No tachypnea, bradypnea or respiratory distress.      Breath sounds: Normal breath sounds. No stridor or decreased air movement. No wheezing.   Chest:   Breasts:     Breasts are symmetrical.      Right: Normal. No swelling, bleeding, inverted nipple, mass, nipple discharge, skin change or tenderness.      Left: Normal. No swelling, bleeding, inverted nipple, mass, nipple discharge, skin change or tenderness.   Abdominal:      General: There is no distension.      Palpations: Abdomen is soft. There is no hepatomegaly, splenomegaly or mass.      Tenderness: There is no abdominal tenderness.      Hernia: No hernia is present. There is no hernia in the left inguinal area or right inguinal area.   Genitourinary:     General: Normal vulva.      Exam position: Supine.      Pubic Area: No rash or pubic lice.       Labia:         " Right: No rash, tenderness, lesion or injury.         Left: No rash, tenderness, lesion or injury.       Urethra: No prolapse, urethral pain, urethral swelling or urethral lesion.      Vagina: Normal. No signs of injury and foreign body. No vaginal discharge, erythema, tenderness, bleeding, lesions or prolapsed vaginal walls.      Cervix: No cervical motion tenderness, discharge, friability, lesion, erythema or cervical bleeding.      Uterus: Not deviated, not enlarged, not fixed, not tender and no uterine prolapse.       Adnexa:         Right: No mass, tenderness or fullness.          Left: No mass, tenderness or fullness.     Lymphadenopathy:      Cervical: No cervical adenopathy.      Upper Body:      Right upper body: No supraclavicular or axillary adenopathy.      Left upper body: No supraclavicular or axillary adenopathy.      Lower Body: No right inguinal adenopathy. No left inguinal adenopathy.   Skin:     General: Skin is warm and dry.   Neurological:      Mental Status: She is alert and oriented to person, place, and time.   Psychiatric:         Mood and Affect: Mood and affect normal.         Speech: Speech normal.         Behavior: Behavior normal. Behavior is cooperative.         Thought Content: Thought content normal.         Judgment: Judgment normal.

## 2025-01-15 ENCOUNTER — OFFICE VISIT (OUTPATIENT)
Dept: OBGYN CLINIC | Facility: CLINIC | Age: 25
End: 2025-01-15
Payer: COMMERCIAL

## 2025-01-15 VITALS
SYSTOLIC BLOOD PRESSURE: 108 MMHG | DIASTOLIC BLOOD PRESSURE: 72 MMHG | HEIGHT: 62 IN | BODY MASS INDEX: 27.97 KG/M2 | WEIGHT: 152 LBS

## 2025-01-15 DIAGNOSIS — N94.6 DYSMENORRHEA: ICD-10-CM

## 2025-01-15 DIAGNOSIS — Z11.3 SCREENING FOR STD (SEXUALLY TRANSMITTED DISEASE): ICD-10-CM

## 2025-01-15 DIAGNOSIS — Z01.411 ENCOUNTER FOR GYNECOLOGICAL EXAMINATION (GENERAL) (ROUTINE) WITH ABNORMAL FINDINGS: Primary | ICD-10-CM

## 2025-01-15 PROCEDURE — G0145 SCR C/V CYTO,THINLAYER,RESCR: HCPCS | Performed by: PHYSICIAN ASSISTANT

## 2025-01-15 PROCEDURE — S0612 ANNUAL GYNECOLOGICAL EXAMINA: HCPCS | Performed by: PHYSICIAN ASSISTANT

## 2025-01-15 PROCEDURE — 87491 CHLMYD TRACH DNA AMP PROBE: CPT | Performed by: PHYSICIAN ASSISTANT

## 2025-01-15 PROCEDURE — 87591 N.GONORRHOEAE DNA AMP PROB: CPT | Performed by: PHYSICIAN ASSISTANT

## 2025-01-15 RX ORDER — DEXTROMETHORPHAN HYDROBROMIDE AND PROMETHAZINE HYDROCHLORIDE 15; 6.25 MG/5ML; MG/5ML
SYRUP ORAL
COMMUNITY
Start: 2024-10-29 | End: 2025-01-15 | Stop reason: ALTCHOICE

## 2025-01-15 NOTE — ASSESSMENT & PLAN NOTE
Encourage trial of ibuprofen 600 mg q 8 hours as needed for menstrual cramps; Advise her to start NSAIDs prior to cramps getting bad. If cramps persist/worsen/desire to manage further - encouraged her to consider hormonal therapy. Reviewed Mirena IUD as prog only and minimal systemic absorption - may help lesson cramps

## 2025-01-17 ENCOUNTER — RESULTS FOLLOW-UP (OUTPATIENT)
Dept: OBGYN CLINIC | Facility: CLINIC | Age: 25
End: 2025-01-17

## 2025-01-17 LAB
C TRACH DNA SPEC QL NAA+PROBE: NEGATIVE
N GONORRHOEA DNA SPEC QL NAA+PROBE: NEGATIVE

## 2025-01-21 LAB
LAB AP GYN PRIMARY INTERPRETATION: NORMAL
LAB AP LMP: NORMAL
Lab: NORMAL

## 2025-06-18 ENCOUNTER — OFFICE VISIT (OUTPATIENT)
Dept: FAMILY MEDICINE CLINIC | Facility: CLINIC | Age: 25
End: 2025-06-18
Payer: COMMERCIAL

## 2025-06-18 VITALS
HEART RATE: 62 BPM | DIASTOLIC BLOOD PRESSURE: 62 MMHG | OXYGEN SATURATION: 99 % | SYSTOLIC BLOOD PRESSURE: 99 MMHG | BODY MASS INDEX: 28.74 KG/M2 | WEIGHT: 156.2 LBS | HEIGHT: 62 IN | TEMPERATURE: 97.1 F | RESPIRATION RATE: 16 BRPM

## 2025-06-18 DIAGNOSIS — R09.81 SINUS CONGESTION: ICD-10-CM

## 2025-06-18 DIAGNOSIS — Z13.1 SCREENING FOR DIABETES MELLITUS: ICD-10-CM

## 2025-06-18 DIAGNOSIS — R51.9 FREQUENT HEADACHES: Primary | ICD-10-CM

## 2025-06-18 DIAGNOSIS — E55.9 VITAMIN D DEFICIENCY: ICD-10-CM

## 2025-06-18 PROCEDURE — 99214 OFFICE O/P EST MOD 30 MIN: CPT | Performed by: NURSE PRACTITIONER

## 2025-06-18 RX ORDER — MOMETASONE FUROATE MONOHYDRATE 50 UG/1
2 SPRAY, METERED NASAL DAILY
Qty: 17 G | Refills: 0 | Status: SHIPPED | OUTPATIENT
Start: 2025-06-18

## 2025-06-18 NOTE — PROGRESS NOTES
Name: Tamara Jenkins      : 2000      MRN: [id number]  Encounter Provider: ADITHYA Cardoso  Encounter Date: 2025   Encounter department: [facility]  :  Assessment & Plan  Frequent headaches  Unremarkable exam other than significant sinus congestion.  Will check labs, recommend starting magnesium and nasonex daily.  Will follow up 1 month  Orders:    Comprehensive metabolic panel; Future    TSH, 3rd generation with Free T4 reflex; Future    CBC and differential; Future    Magnesium; Future    Vitamin B12; Future    Magnesium Citrate 200 MG TABS; Take 400 mg by mouth daily at bedtime    mometasone (NASONEX) 50 mcg/act nasal spray; 2 sprays into each nostril daily    Sinus congestion  Start nasonex daily   Orders:    mometasone (NASONEX) 50 mcg/act nasal spray; 2 sprays into each nostril daily           History of Present Illness   Pt is a 24 y.o. female who is seen today for evaluation of headaches.  Past medical history of GERD, dysmennorhea.  She reports she has been having migraines for the last 3-4 months.  She says she seems to have a headache for 3 days then it goes away for 3 days.  Headache described as frontal pressure.  Occasionally headache makes her feel nauseated, no vomiting.  Headaches cause blurry vision.  When pain is bad she sometimes feels a bit lightheaded.  Lights bother her when she has a headache.  She rates headache pain 7/10.  She has taken excedrin migraine and that helps, but headache comes back the next day.  Periods do not seem to correspond to her menstrual cycle.  Nothing has changed prior to start of headaches regarding diet, hydration, stress, sleep.  She has not had an eye exam in a very long time, she is scheduled to have an eye exam .      Review of Systems   Constitutional:  Positive for fatigue. Negative for appetite change and fever.   Eyes:  Positive for photophobia and visual disturbance.   Respiratory:  Negative for shortness  "of breath.    Cardiovascular:  Negative for chest pain.   Gastrointestinal:  Positive for nausea. Negative for abdominal pain and vomiting.   Genitourinary:  Negative for difficulty urinating and menstrual problem.   Musculoskeletal:  Negative for back pain and neck pain.   Neurological:  Positive for headaches. Negative for dizziness, speech difficulty, light-headedness and numbness.   Psychiatric/Behavioral:  Negative for sleep disturbance.        Objective   BP 99/62 (BP Location: Left arm, Patient Position: Sitting, Cuff Size: Standard)   Pulse 62   Temp (!) 97.1 °F (36.2 °C) (Tympanic)   Resp 16   Ht 5' 2\" (1.575 m)   Wt 70.9 kg (156 lb 3.2 oz)   SpO2 99%   BMI 28.57 kg/m²      Physical Exam  Vitals reviewed.   Constitutional:       General: She is awake. She is not in acute distress.     Appearance: Normal appearance. She is well-developed and well-groomed. She is not ill-appearing.   HENT:      Head: Normocephalic.      Nose: Mucosal edema and congestion present.      Right Turbinates: Swollen.      Left Turbinates: Swollen.      Mouth/Throat:      Lips: Pink.      Mouth: Mucous membranes are moist.      Pharynx: Oropharynx is clear.     Eyes:      General: Lids are normal.      Extraocular Movements: Extraocular movements intact.      Conjunctiva/sclera: Conjunctivae normal.      Pupils: Pupils are equal, round, and reactive to light.     Neck:      Thyroid: No thyromegaly.      Vascular: Normal carotid pulses. No carotid bruit.     Cardiovascular:      Rate and Rhythm: Normal rate and regular rhythm.      Heart sounds: Normal heart sounds. No murmur heard.  Pulmonary:      Effort: Pulmonary effort is normal. No tachypnea, accessory muscle usage or respiratory distress.      Breath sounds: Normal breath sounds.     Musculoskeletal:      Cervical back: No spinous process tenderness or muscular tenderness. Normal range of motion.   Lymphadenopathy:      Cervical: No cervical adenopathy. "     Neurological:      Mental Status: She is alert and oriented to person, place, and time.      Cranial Nerves: Cranial nerves 2-12 are intact.      Motor: Motor function is intact.      Coordination: Romberg sign negative.      Gait: Gait is intact.     Psychiatric:         Attention and Perception: Attention normal.         Mood and Affect: Mood normal.         Speech: Speech normal.         Behavior: Behavior normal. Behavior is cooperative.         Thought Content: Thought content normal.         Cognition and Memory: Cognition normal.         Judgment: Judgment normal.